# Patient Record
Sex: FEMALE | Race: WHITE | Employment: FULL TIME | URBAN - METROPOLITAN AREA
[De-identification: names, ages, dates, MRNs, and addresses within clinical notes are randomized per-mention and may not be internally consistent; named-entity substitution may affect disease eponyms.]

---

## 2023-01-05 ENCOUNTER — OFFICE VISIT (OUTPATIENT)
Dept: ENT CLINIC | Age: 53
End: 2023-01-05
Payer: COMMERCIAL

## 2023-01-05 ENCOUNTER — OFFICE VISIT (OUTPATIENT)
Dept: ENT CLINIC | Age: 53
End: 2023-01-05

## 2023-01-05 VITALS — WEIGHT: 160 LBS | RESPIRATION RATE: 17 BRPM | HEIGHT: 62 IN | BODY MASS INDEX: 29.44 KG/M2

## 2023-01-05 DIAGNOSIS — H92.01 OTALGIA, RIGHT: ICD-10-CM

## 2023-01-05 DIAGNOSIS — H81.11 BENIGN PAROXYSMAL POSITIONAL VERTIGO OF RIGHT EAR: Primary | ICD-10-CM

## 2023-01-05 DIAGNOSIS — H90.12 CONDUCTIVE HEARING LOSS OF LEFT EAR WITH UNRESTRICTED HEARING OF RIGHT EAR: Primary | ICD-10-CM

## 2023-01-05 DIAGNOSIS — H93.8X2 EAR FULLNESS, LEFT: ICD-10-CM

## 2023-01-05 DIAGNOSIS — H90.12 CONDUCTIVE HEARING LOSS OF LEFT EAR WITH UNRESTRICTED HEARING OF RIGHT EAR: ICD-10-CM

## 2023-01-05 PROCEDURE — 99244 OFF/OP CNSLTJ NEW/EST MOD 40: CPT | Performed by: STUDENT IN AN ORGANIZED HEALTH CARE EDUCATION/TRAINING PROGRAM

## 2023-01-05 RX ORDER — DESOGESTREL/ETHINYL ESTRADIOL AND ETHINYL ESTRADIOL 21-5 (28)
KIT ORAL
COMMUNITY
Start: 2022-12-09

## 2023-01-05 RX ORDER — DOXYCYCLINE HYCLATE 100 MG/1
CAPSULE ORAL
COMMUNITY
Start: 2022-12-21

## 2023-01-05 RX ORDER — NEOMYCIN SULFATE, POLYMYXIN B SULFATE, HYDROCORTISONE 3.5; 10000; 1 MG/ML; [USP'U]/ML; MG/ML
SOLUTION/ DROPS AURICULAR (OTIC)
COMMUNITY
Start: 2022-12-21

## 2023-01-05 RX ORDER — MECLIZINE HYDROCHLORIDE 25 MG/1
TABLET ORAL
COMMUNITY
Start: 2022-12-20

## 2023-01-05 RX ORDER — GUAIFENESIN 600 MG/1
600 TABLET, EXTENDED RELEASE ORAL 2 TIMES DAILY
COMMUNITY
Start: 2022-12-21

## 2023-01-05 RX ORDER — PREDNISONE 20 MG/1
TABLET ORAL
COMMUNITY
Start: 2022-12-20

## 2023-01-05 ASSESSMENT — ENCOUNTER SYMPTOMS: COUGH: 0

## 2023-01-05 NOTE — PROGRESS NOTES
AUDIOLOGY EVALUATION    Kvng Cee had Audiometry performed today. The patient reports dizziness. Results as follows: Audiometry    Test Performed - Comprehensive Audiogram    Type of Loss - Right Ear: normal hearing                          Left Ear: abnormal hearing: degree of loss is normal to mild conductive hearing loss     SRT   Measurement Right Ear Left Ear   Value 20 25   Unit dB dB     Discrimination  Measurement Right Ear Left Ear   Value 100% 100%   Unit dB dB     Recommend  Retest following otologic management    A.  [id number] [employer]  Audiologist

## 2023-01-05 NOTE — PROGRESS NOTES
HPI:  Terence Abarca is a 47 y.o. female seen New    Chief Complaint   Patient presents with    Dizziness     Patient presents today with c/o dizziness and balance issues x a few months . She also notes R sided ear pain she recently was treated with abx . She also had bilateral ear pressure L>R . She states that she also had some confusion . 62-year-old female seen for new patient referral evaluation with multiple ENT related complaints first to include long-term vertigo symptoms. She describes having had positional onset room spinning type vertigo ever since the late 1980s which has continued off and on intermittently since that time. Unfortunately this has significantly worsened in the last 6 months where she is now describing having symptoms of disequilibrium and balance issues almost daily and rather consistently. This certainly worsens when she lies down bends over or looks up. This tends to worsen also when she rotates to her left or right side. This is the worst that her vertigo has been in the past.  She also has had some intermittent ear symptoms to include right-sided otalgia and left-sided ear fullness clogged sensation. She is unsure how long these have been going on but certainly over the last few months. Overall she has trouble with memory and has had some confusion as well. She has been on Flonase nasal sprays consistently for the last 2 or 3 weeks for the ear fullness symptoms. She has also had some trials of oral antibiotics and oral steroid therapies. There has been minimal relief. She has trialed some at home exercises for her vertigo in the past without much benefit. Past Medical History, Past Surgical History, Family history, Social History, and Medications were all reviewed with the patient today and updated as necessary. Allergies   Allergen Reactions    Ciprofloxacin Nausea And Vomiting       There is no problem list on file for this patient.       Current Outpatient Medications   Medication Sig    guaiFENesin (MUCINEX) 600 MG extended release tablet Take 600 mg by mouth 2 times daily    meclizine (ANTIVERT) 25 MG tablet TAKE 1 TABLET BY MOUTH 4 TIMES DAILY AS NEEDED FOR DIZZINESS AND FOR NAUSEA FOR 7 DAYS    doxycycline hyclate (VIBRAMYCIN) 100 MG capsule TAKE 1 CAPSULE (100 MG) 2 (TWO) TIMES A DAY FOR 7 DAYS TAKE WITH FOOD    AZURETTE 0.15-0.02/0.01 MG (21/5) per tablet     neomycin-polymyxin-hydrocortisone 1 % SOLN otic solution PUT 3 DROPS IN INTO RIGHT EAR 3 TIMES A DAY FOR 10 DAYS    predniSONE (DELTASONE) 20 MG tablet TAKE 1 TABLET BY MOUTH TWICE DAILY FOR 3 DAYS, THEN TAKE 1 TABLET ONCE DAILY FOR 3 DAYS     No current facility-administered medications for this visit. History reviewed. No pertinent past medical history. History reviewed. No pertinent surgical history. 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 History reviewed. No pertinent family history. ROS:    Review of Systems   Constitutional:  Negative for chills and fever. HENT:  Negative for hearing loss. Respiratory:  Negative for cough. Cardiovascular:  Negative for chest pain. Musculoskeletal:  Negative for myalgias. Skin:  Negative for rash. Neurological:  Negative for dizziness. PHYSICAL EXAM:    Resp 17   Ht 5' 2\" (1.575 m)   Wt 160 lb (72.6 kg)   BMI 29.26 kg/m²     Physical Exam  Vitals and nursing note reviewed. Constitutional:       Appearance: Normal appearance. HENT:      Head: Normocephalic and atraumatic. Right Ear: Tympanic membrane, ear canal and external ear normal. No middle ear effusion. There is no impacted cerumen. Tympanic membrane is not scarred, perforated, erythematous or retracted.       Left Ear: Tympanic membrane, ear canal and external ear normal.  No middle ear effusion. There is no impacted cerumen. Tympanic membrane is not scarred, perforated, erythematous or retracted. Ears:      Comments: Bilateral small EACs. EACs are otherwise unremarkable patent without edema or erythema. TMs intact no perforation retractions or middle ear effusions     Nose: Nose normal. No congestion or rhinorrhea. Mouth/Throat:      Mouth: Mucous membranes are moist.      Pharynx: Oropharynx is clear. No oropharyngeal exudate or posterior oropharyngeal erythema. Eyes:      General: No scleral icterus. Pulmonary:      Effort: Pulmonary effort is normal.   Musculoskeletal:      Cervical back: Normal range of motion and neck supple. No rigidity. Lymphadenopathy:      Cervical: No cervical adenopathy. Skin:     General: Skin is warm and dry. Neurological:      Mental Status: She is alert and oriented to person, place, and time. Psychiatric:         Mood and Affect: Mood normal.         Behavior: Behavior normal.        [place]ann[place]-Hallpike testing highly positive with head turn to the left      ASSESSMENT and PLAN        ICD-10-CM    1. Benign paroxysmal positional vertigo of right ear  H81.11       2. Ear fullness, left  H93.8X2       3. Otalgia, right  H92.01           Patient was reassured of no concerning findings on her ear exam.  Due to her stated symptoms of vertigo and left-sided ear fullness diminished hearing comprehensive hearing testing was performed which does show an asymmetrical left-sided conductive hearing loss as compared to a normal hearing on the right side. She has 100% speech discrimination scores. Her conductive hearing loss is mild approximately 10 to 20 dB most noted at the low frequencies and high-frequency. The bone conductive loss does correct at approximately 2000 Hz to 4000 Hz.   Somewhat suspicious of possible otosclerosis or other middle ear pathology as she has a normal ear exam.  CT of the IACs has been ordered to further evaluate for any potential middle ear pathology. Patient also has a longstanding history of vertigo symptoms with her prior symptom description likely to be that of BPPV but now with more daily and consistent disequilibrium and balance issues. She had a highly positive left-sided [place]-Hallpike testing for which I have discussed pathophysiology of BPPV and provided educational handout for her reading. I have also advised for her to begin utilizing Koroma-Daroff exercises twice daily while she awaits a referral to physical therapy for vestibular rehab. Follow-up in 1 month for repeat evaluation to assess for improvement to her vertigo and to review over CT imaging results.       Jeffrey Sumeet, DO  1/5/2023

## 2023-01-18 ENCOUNTER — HOSPITAL ENCOUNTER (OUTPATIENT)
Dept: CT IMAGING | Age: 53
Discharge: HOME | End: 2023-01-21
Payer: COMMERCIAL

## 2023-01-18 DIAGNOSIS — H90.12 CONDUCTIVE HEARING LOSS OF LEFT EAR WITH UNRESTRICTED HEARING OF RIGHT EAR: ICD-10-CM

## 2023-01-18 DIAGNOSIS — H81.11 BENIGN PAROXYSMAL POSITIONAL VERTIGO OF RIGHT EAR: ICD-10-CM

## 2023-01-18 PROCEDURE — 6360000004 HC RX CONTRAST MEDICATION: Performed by: STUDENT IN AN ORGANIZED HEALTH CARE EDUCATION/TRAINING PROGRAM

## 2023-01-18 PROCEDURE — 70481 CT ORBIT/EAR/FOSSA W/DYE: CPT

## 2023-01-18 RX ADMIN — IOPAMIDOL 100 ML: 755 INJECTION, SOLUTION INTRAVENOUS at 15:39

## 2023-07-04 ENCOUNTER — HOSPITAL ENCOUNTER (EMERGENCY)
Age: 53
Discharge: HOME | End: 2023-07-04
Attending: EMERGENCY MEDICINE
Payer: COMMERCIAL

## 2023-07-04 VITALS
BODY MASS INDEX: 29.44 KG/M2 | HEART RATE: 75 BPM | RESPIRATION RATE: 17 BRPM | OXYGEN SATURATION: 98 % | HEIGHT: 62 IN | TEMPERATURE: 98.8 F | WEIGHT: 160 LBS | DIASTOLIC BLOOD PRESSURE: 89 MMHG | SYSTOLIC BLOOD PRESSURE: 127 MMHG

## 2023-07-04 DIAGNOSIS — N93.9 ABNORMAL VAGINAL BLEEDING: Primary | ICD-10-CM

## 2023-07-04 LAB
ABO + RH BLD: NORMAL
ANION GAP SERPL CALC-SCNC: 2 MMOL/L (ref 2–11)
BASOPHILS # BLD: 0.1 K/UL (ref 0–0.2)
BASOPHILS NFR BLD: 1 % (ref 0–2)
BLOOD GROUP ANTIBODIES SERPL: NORMAL
BUN SERPL-MCNC: 8 MG/DL (ref 6–23)
CALCIUM SERPL-MCNC: 9.4 MG/DL (ref 8.3–10.4)
CHLORIDE SERPL-SCNC: 111 MMOL/L (ref 101–110)
CO2 SERPL-SCNC: 26 MMOL/L (ref 21–32)
CREAT SERPL-MCNC: 0.9 MG/DL (ref 0.6–1)
DIFFERENTIAL METHOD BLD: ABNORMAL
EOSINOPHIL # BLD: 0.1 K/UL (ref 0–0.8)
EOSINOPHIL NFR BLD: 1 % (ref 0.5–7.8)
ERYTHROCYTE [DISTWIDTH] IN BLOOD BY AUTOMATED COUNT: 12.5 % (ref 11.9–14.6)
GLUCOSE SERPL-MCNC: 95 MG/DL (ref 65–100)
HCT VFR BLD AUTO: 39.5 % (ref 35.8–46.3)
HGB BLD-MCNC: 13.4 G/DL (ref 11.7–15.4)
IMM GRANULOCYTES # BLD AUTO: 0 K/UL (ref 0–0.5)
IMM GRANULOCYTES NFR BLD AUTO: 0 % (ref 0–5)
LYMPHOCYTES # BLD: 3 K/UL (ref 0.5–4.6)
LYMPHOCYTES NFR BLD: 48 % (ref 13–44)
MCH RBC QN AUTO: 31.2 PG (ref 26.1–32.9)
MCHC RBC AUTO-ENTMCNC: 33.9 G/DL (ref 31.4–35)
MCV RBC AUTO: 92.1 FL (ref 82–102)
MONOCYTES # BLD: 0.4 K/UL (ref 0.1–1.3)
MONOCYTES NFR BLD: 6 % (ref 4–12)
NEUTS SEG # BLD: 2.8 K/UL (ref 1.7–8.2)
NEUTS SEG NFR BLD: 44 % (ref 43–78)
NRBC # BLD: 0 K/UL (ref 0–0.2)
PLATELET # BLD AUTO: 335 K/UL (ref 150–450)
PMV BLD AUTO: 10.1 FL (ref 9.4–12.3)
POTASSIUM SERPL-SCNC: 3.7 MMOL/L (ref 3.5–5.1)
RBC # BLD AUTO: 4.29 M/UL (ref 4.05–5.2)
SODIUM SERPL-SCNC: 139 MMOL/L (ref 133–143)
SPECIMEN EXP DATE BLD: NORMAL
WBC # BLD AUTO: 6.4 K/UL (ref 4.3–11.1)

## 2023-07-04 PROCEDURE — 86850 RBC ANTIBODY SCREEN: CPT

## 2023-07-04 PROCEDURE — 86901 BLOOD TYPING SEROLOGIC RH(D): CPT

## 2023-07-04 PROCEDURE — 99284 EMERGENCY DEPT VISIT MOD MDM: CPT

## 2023-07-04 PROCEDURE — 2580000003 HC RX 258: Performed by: EMERGENCY MEDICINE

## 2023-07-04 PROCEDURE — 96374 THER/PROPH/DIAG INJ IV PUSH: CPT

## 2023-07-04 PROCEDURE — 86900 BLOOD TYPING SEROLOGIC ABO: CPT

## 2023-07-04 PROCEDURE — 85025 COMPLETE CBC W/AUTO DIFF WBC: CPT

## 2023-07-04 PROCEDURE — 80048 BASIC METABOLIC PNL TOTAL CA: CPT

## 2023-07-04 PROCEDURE — 2500000003 HC RX 250 WO HCPCS: Performed by: EMERGENCY MEDICINE

## 2023-07-04 RX ORDER — TRANEXAMIC ACID 650 MG/1
1300 TABLET ORAL 3 TIMES DAILY PRN
Qty: 30 TABLET | Refills: 0 | Status: SHIPPED | OUTPATIENT
Start: 2023-07-04 | End: 2023-07-09

## 2023-07-04 RX ADMIN — TRANEXAMIC ACID 1000 MG: 100 INJECTION, SOLUTION INTRAVENOUS at 19:40

## 2023-07-04 ASSESSMENT — LIFESTYLE VARIABLES
HOW MANY STANDARD DRINKS CONTAINING ALCOHOL DO YOU HAVE ON A TYPICAL DAY: PATIENT DOES NOT DRINK
HOW OFTEN DO YOU HAVE A DRINK CONTAINING ALCOHOL: NEVER

## 2023-07-04 ASSESSMENT — PAIN - FUNCTIONAL ASSESSMENT: PAIN_FUNCTIONAL_ASSESSMENT: NONE - DENIES PAIN

## 2023-07-04 ASSESSMENT — PAIN SCALES - GENERAL: PAINLEVEL_OUTOF10: 0

## 2023-07-04 NOTE — ED TRIAGE NOTES
Pt states heavy vaginal bleeding starting yesterday. States she soaks a pad every 30 min. States clots are coming out. Denies pain today. States she thinks she is starting menopause. Had a large mass removed from inside uterus in Dec. And started rebleeding in June/July. Took iron pill early today. (+) lightheadedness. (-) nausea, vomiting.